# Patient Record
Sex: FEMALE | Race: WHITE
[De-identification: names, ages, dates, MRNs, and addresses within clinical notes are randomized per-mention and may not be internally consistent; named-entity substitution may affect disease eponyms.]

---

## 2020-09-02 ENCOUNTER — HOSPITAL ENCOUNTER (EMERGENCY)
Dept: HOSPITAL 57 - BURERS | Age: 65
Discharge: HOME | End: 2020-09-02
Payer: MEDICARE

## 2020-09-02 DIAGNOSIS — M54.6: ICD-10-CM

## 2020-09-02 DIAGNOSIS — Z79.899: ICD-10-CM

## 2020-09-02 DIAGNOSIS — E78.00: ICD-10-CM

## 2020-09-02 DIAGNOSIS — M79.7: ICD-10-CM

## 2020-09-02 DIAGNOSIS — F31.9: ICD-10-CM

## 2020-09-02 DIAGNOSIS — E11.9: ICD-10-CM

## 2020-09-02 DIAGNOSIS — G89.29: ICD-10-CM

## 2020-09-02 DIAGNOSIS — Z87.891: ICD-10-CM

## 2020-09-02 DIAGNOSIS — I10: ICD-10-CM

## 2020-09-02 DIAGNOSIS — Z86.73: ICD-10-CM

## 2020-09-02 DIAGNOSIS — M54.5: Primary | ICD-10-CM

## 2020-09-02 LAB
ALBUMIN SERPL BCG-MCNC: 3.3 G/DL (ref 3.4–4.8)
ALP SERPL-CCNC: 76 U/L (ref 40–110)
ALT SERPL W P-5'-P-CCNC: 11 U/L (ref 8–55)
ANION GAP SERPL CALC-SCNC: 13 MMOL/L (ref 10–20)
AST SERPL-CCNC: 15 U/L (ref 5–34)
BACTERIA UR QL AUTO: (no result) HPF
BASOPHILS # BLD AUTO: 0.1 THOU/UL (ref 0–0.2)
BASOPHILS NFR BLD AUTO: 1.4 % (ref 0–1)
BILIRUB SERPL-MCNC: 0.3 MG/DL (ref 0.2–1.2)
BUN SERPL-MCNC: 16 MG/DL (ref 9.8–20.1)
CALCIUM SERPL-MCNC: 8.9 MG/DL (ref 7.8–10.44)
CHLORIDE SERPL-SCNC: 107 MMOL/L (ref 98–107)
CO2 SERPL-SCNC: 28 MMOL/L (ref 23–31)
CREAT CL PREDICTED SERPL C-G-VRATE: 0 ML/MIN (ref 70–130)
EOSINOPHIL # BLD AUTO: 0.2 THOU/UL (ref 0–0.7)
EOSINOPHIL NFR BLD AUTO: 3.5 % (ref 0–10)
GLOBULIN SER CALC-MCNC: 3.5 G/DL (ref 2.4–3.5)
GLUCOSE SERPL-MCNC: 120 MG/DL (ref 80–115)
HGB BLD-MCNC: 11.1 G/DL (ref 12–16)
LYMPHOCYTES # BLD AUTO: 2 THOU/UL (ref 1.2–3.4)
LYMPHOCYTES NFR BLD AUTO: 32.3 % (ref 21–51)
MACROCYTES BLD QL SMEAR: (no result) (100X)
MCH RBC QN AUTO: 23.8 PG (ref 27–31)
MCV RBC AUTO: 83 FL (ref 78–98)
MDIFF COMPLETE?: YES
MICROCYTES BLD QL SMEAR: (no result) (100X)
MONOCYTES # BLD AUTO: 0.4 THOU/UL (ref 0.11–0.59)
MONOCYTES NFR BLD AUTO: 6.3 % (ref 0–10)
MUCOUS THREADS UR QL AUTO: (no result) LPF
NEUTROPHILS # BLD AUTO: 3.4 THOU/UL (ref 1.4–6.5)
NEUTROPHILS NFR BLD AUTO: 56.6 % (ref 42–75)
PLATELET # BLD AUTO: 248 THOU/UL (ref 130–400)
POTASSIUM SERPL-SCNC: 3.9 MMOL/L (ref 3.5–5.1)
PROT UR STRIP.AUTO-MCNC: 30 MG/DL
RBC # BLD AUTO: 4.64 MILL/UL (ref 4.2–5.4)
SODIUM SERPL-SCNC: 144 MMOL/L (ref 136–145)
SP GR UR STRIP: 1.02 (ref 1–1.03)
WBC # BLD AUTO: 6 THOU/UL (ref 4.8–10.8)
WBC UR QL AUTO: (no result) HPF (ref 0–3)

## 2020-09-02 PROCEDURE — 96374 THER/PROPH/DIAG INJ IV PUSH: CPT

## 2020-09-02 PROCEDURE — 96361 HYDRATE IV INFUSION ADD-ON: CPT

## 2020-09-02 PROCEDURE — 81015 MICROSCOPIC EXAM OF URINE: CPT

## 2020-09-02 PROCEDURE — 80053 COMPREHEN METABOLIC PANEL: CPT

## 2020-09-02 PROCEDURE — 85025 COMPLETE CBC W/AUTO DIFF WBC: CPT

## 2020-09-02 PROCEDURE — 81003 URINALYSIS AUTO W/O SCOPE: CPT

## 2020-09-15 ENCOUNTER — HOSPITAL ENCOUNTER (EMERGENCY)
Dept: HOSPITAL 57 - BURERS | Age: 65
Discharge: HOME | End: 2020-09-15
Payer: MEDICARE

## 2020-09-15 DIAGNOSIS — I10: ICD-10-CM
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
## 2020-09-15 DIAGNOSIS — E11.9: ICD-10-CM

## 2020-09-15 DIAGNOSIS — S40.022A: Primary | ICD-10-CM

## 2020-09-15 DIAGNOSIS — E78.00: ICD-10-CM

## 2020-09-15 DIAGNOSIS — M79.7: ICD-10-CM

## 2020-09-15 DIAGNOSIS — Z86.73: ICD-10-CM

## 2020-09-15 DIAGNOSIS — F17.290: ICD-10-CM

## 2020-09-15 DIAGNOSIS — Z79.899: ICD-10-CM

## 2020-09-15 PROCEDURE — 96372 THER/PROPH/DIAG INJ SC/IM: CPT

## 2020-09-15 NOTE — RAD
LEFT HUMERUS TWO VIEWS:

9/15/20

 

No fracture or dislocation was seen. Degenerative changes are seen in the AC joint. The elbow is not 
adequately evaluated, however. The articulations of the bones at the elbow seem unusual but this area
 is not seen well enough to comment on further. Correlate with any symptoms in this location. 

 

IMPRESSION: 

No fracture seen. See comments on elbow. 

 

 

Code T 

 

POS: HOME